# Patient Record
Sex: MALE | Race: OTHER | HISPANIC OR LATINO | URBAN - METROPOLITAN AREA
[De-identification: names, ages, dates, MRNs, and addresses within clinical notes are randomized per-mention and may not be internally consistent; named-entity substitution may affect disease eponyms.]

---

## 2023-04-03 ENCOUNTER — EMERGENCY (EMERGENCY)
Facility: HOSPITAL | Age: 38
LOS: 0 days | Discharge: HOME | End: 2023-04-03
Attending: EMERGENCY MEDICINE
Payer: COMMERCIAL

## 2023-04-03 VITALS
RESPIRATION RATE: 18 BRPM | DIASTOLIC BLOOD PRESSURE: 88 MMHG | OXYGEN SATURATION: 100 % | TEMPERATURE: 99 F | SYSTOLIC BLOOD PRESSURE: 143 MMHG | HEART RATE: 88 BPM

## 2023-04-03 VITALS — HEIGHT: 68 IN | WEIGHT: 179.9 LBS
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
## 2023-04-03 DIAGNOSIS — R51.9 HEADACHE, UNSPECIFIED: ICD-10-CM

## 2023-04-03 DIAGNOSIS — M54.2 CERVICALGIA: ICD-10-CM
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
## 2023-04-03 DIAGNOSIS — S00.93XA CONTUSION OF UNSPECIFIED PART OF HEAD, INITIAL ENCOUNTER: ICD-10-CM

## 2023-04-03 DIAGNOSIS — I10 ESSENTIAL (PRIMARY) HYPERTENSION: ICD-10-CM

## 2023-04-03 DIAGNOSIS — E78.5 HYPERLIPIDEMIA, UNSPECIFIED: ICD-10-CM

## 2023-04-03 PROCEDURE — 99284 EMERGENCY DEPT VISIT MOD MDM: CPT | Mod: 25

## 2023-04-03 PROCEDURE — 70450 CT HEAD/BRAIN W/O DYE: CPT | Mod: 26,MA

## 2023-04-03 PROCEDURE — 70450 CT HEAD/BRAIN W/O DYE: CPT | Mod: MA

## 2023-04-03 PROCEDURE — 99284 EMERGENCY DEPT VISIT MOD MDM: CPT

## 2023-04-03 RX ORDER — IBUPROFEN 200 MG
400 TABLET ORAL ONCE
Refills: 0 | Status: COMPLETED | OUTPATIENT
Start: 2023-04-03 | End: 2023-04-03

## 2023-04-03 RX ADMIN — Medication 400 MILLIGRAM(S): at 10:06

## 2023-04-03 NOTE — ED PROVIDER NOTE - PSYCHIATRIC, MLM
Alert and oriented to person, place, time/situation. normal mood and affect. no apparent risk to self or others.
2023 04:36

## 2023-04-03 NOTE — ED PROVIDER NOTE - OBJECTIVE STATEMENT
used, id# [id number]  38 year old male with hx of HTN and HLD presents to ED for c/o headache and neck pain x3 days after a piece of wood fell on his head at work. States after the wood fell on him, he spit up blood. Patient endorses a lot of pain and swelling. Denies LOC. Denies vomiting. Patient hasn't taken Tylenol or Motrin since the accident.

## 2023-04-03 NOTE — ED ADULT NURSE NOTE - OBJECTIVE STATEMENT
Patient with complaints of headache, neck pain s/p piece of wood (2x4) hit him in the head 3 days ago; no LOC; patient states that he spit up blood after incident; no nausea/vomiting since; no vision changes, no weakness; ambulating with steady gait- PAM Cerrato RN

## 2023-04-03 NOTE — ED PROVIDER NOTE - PATIENT PORTAL LINK FT
You can access the FollowMyHealth Patient Portal offered by [facility] by registering at the following website: [email or web address]. By joining [facility]’s FollowMyHealth portal, you will also be able to view your health information using other applications (apps) compatible with our system.

## 2023-04-03 NOTE — ED PROVIDER NOTE - NSFOLLOWUPINSTRUCTIONS_ED_ALL_ED_FT
Follow-up with your regular physician  Return with any persistent/worsening symptoms.   Ibuprofen as needed for pain    Lesión en la lata en los adultos  Head Injury, Adult       Hay muchos tipos de lesiones en la lata. Las lesiones en la lata pueden ser tan leves gilda un chichón pequeño o pueden ser un problema médico grave. Algunas de las lesiones graves en la lata son:    Lesión que provoque un impacto en el cerebro (conmoción cerebral).  Un moretón (contusión) en el cerebro. Rodanthe significa que hay hemorragia en el cerebro que puede causar hinchazón.  Fisura en el cráneo (fractura de cráneo).  Hemorragia en el cerebro que se acumula, se coagula y forma shubham protuberancia (hematoma).    Después de shubham lesión en la lata, la mayoría de los problemas ocurren dentro de las primeras 24 horas, moy los efectos secundarios pueden aparecer entre 7 y 10 días después de la lesión. Es importante controlar canales afección para andria si hay cambios. Es posible que deban observarlo en el departamento de emergencias o en el servicio de atención urgente, o también puede ser que deban hospitalizarlo.    ¿Cuáles son las causas?  Hay muchas causas posibles de shubham lesión en la lata. Las lesiones graves en la lata puede deberse a un accidente automovilístico, a accidentes en bicicleta o motocicleta, a lesiones deportivas, a caídas y a ser golpeado por un objeto.    ¿Cuáles son los síntomas?  Los síntomas de lesión en la lata incluyen shubham contusión, un chichón o sangrado en el lugar de la lesión. Otros síntomas físicos pueden ser:    Dolor de lata.  Náuseas o vómitos.  Mareos.  Visión borrosa o doble.  Sentir incomodidad cerca de luces brillantes o ruidos eden.  Convulsiones.  Cansancio.  Dificultad para despertarse.  Pérdida de la conciencia.    Los síntomas mentales o emocionales pueden incluir los siguientes:    Irritabilidad.  Confusión y problemas de memoria.  Falta de atención y concentración.  Cambios en los hábitos de alimentación o en el sueño.  Sentir ansiedad o depresión.    ¿Cómo se diagnostica?  Esta afección suele diagnosticarse en función de los síntomas, de shubham descripción de la lesión y de un examen físico. También pueden hacerle estudios de diagnóstico por imágenes, gilda shubham resonancia magnética (RM) o shubham exploración por tomografía computarizada (TC).    ¿Cómo se trata?  El tratamiento de esta afección depende de la gravedad y el tipo de lesión que sufrió. El objetivo principal del tratamiento es prevenir complicaciones y darle tiempo al cerebro para que se recupere.        Lesión de lata leve    Si usted sufre shubham lesión de lata leve, es posible que lo envíen a casa, y el tratamiento puede incluir lo siguiente:    Observación. Un adulto responsable debe quedarse con usted ronal 24 horas después de producida la lesión y controlarlo con frecuencia.  Lincoln físico.  Lincoln cerebral.  Analgésicos.        Lesión de lata grave    Si tiene shubham lesión de lata grave, el tratamiento puede incluir lo siguiente:    Observación minuciosa. Rodanthe incluye hospitalización con la siguiente atención:    Exámenes físicos frecuentes.  Controles frecuentes del funcionamiento del cerebro y el sistema nervioso (estado neurológico).  Control de la presión arterial y los niveles de oxígeno.  Medicamentos para aliviar el dolor, prevenir las convulsiones y disminuir la hinchazón del cerebro.  Protección de las vías respiratorias y asistencia respiratoria. Rodanthe puede incluir un respirador.  Tratamientos para controlar y tratar la hinchazón dentro del cerebro.  Cirugía de cerebro. Esta puede ser necesaria en los siguientes casos:    Extraer shubham acumulación de itz o coágulos de itz.  Interrumpir el sangrado.  Retirar shubham parte del cráneo para dejar espacio a fin de que el cerebro se hinche.    Siga estas instrucciones en canales casa:      Actividad    Descanse y evite actividades que sherif físicamente difíciles o agotadoras.  Asegúrese de dormir lo suficiente.  Deje que el cerebro descanse limitando las actividades que requieran pensar mucho o prestar atención, gilda las siguientes:    Mirar televisión.  Jugar juegos de memoria y armar rompecabezas.  Tareas para el hogar o trabajos relacionados con el empleo.  Trabajar en la computadora, usar las redes sociales y enviar mensajes de texto.  Evite actividades que puedan causar otra lesión en la lata, gilda practicar deportes, hasta que el médico lo autorice. Puede ser peligroso tener otra lesión en la lata antes de que se haya recuperado de la primera.  Pregúntele al médico cuándo puede retomar saira actividades habituales, incluidos el trabajo o el estudio. Pídale al médico un plan escalonado para retomar saira actividades de forma gradual.  Consulte a canales médico sobre cuándo puede conducir, andar en bicicleta o usar maquinaria pesada. La capacidad para reaccionar puede ser más lenta luego de shubham lesión cerebral. No realice estas actividades si se siente mareado.        Estilo de jose     No suman alcohol hasta que el médico lo autorice. No consuma drogas. El alcohol y ciertas drogas pueden demorar canales recuperación y ponerlo en riesgo de nuevas lesiones.  Si le resulta más difícil que lo habitual recordar las cosas, escríbalas.  Trate de hacer shubham cosa por vez si se distrae con facilidad.  Consulte con familiares y amigos si debe panda decisiones importantes.  Cuénteles sobre canales lesión, los síntomas y las restricciones a saira amigos, a canales jenny, a un colega de confianza y a canales gerente en el trabajo. Pídales que observen si aparecen nuevos problemas o empeoran los existentes.        Instrucciones generales    Whiteface los medicamentos de venta ralph y los recetados solamente gilda se lo haya indicado el médico.  Pídale a alguien que lo acompañe ronal 24 horas después de la lesión en la lata. Esta persona debe observar cambios en los síntomas y estar preparada para obtener ayuda médica.  Concurra a todas las visitas de seguimiento gilda se lo haya indicado el médico. Rodanthe es importante.    ¿Cómo se chrissy?  Trabaje para mejorar el equilibrio y la fuerza a fin de evitar caídas.  Use el cinturón de seguridad cuando se encuentre en un vehículo en movimiento.  Use un mag al andar en bicicleta, esquiar o practicar cualquier otro deporte o actividad que tenga riesgo de lesiones.  Si wm alcohol:    Limite la cantidad que wm:    De 0 a 1 medida por día para las mujeres que no estén embarazadas.  De 0 a 2 medidas por día para los hombres.  Esté atento a la cantidad de alcohol que hay en las bebidas que les. En los [place], shubham medida equivale a shubham botella de cerveza de 12 oz (355 ml), un vaso de vino de 5 oz (148 ml) o un vaso de shubham bebida alcohólica de murray graduación de 1½ oz (44 ml).  Whiteface medidas de seguridad en canales hogar, por ejemplo:    Mantenga los pisos y las escaleras en orden.  Coloque barras para sostén en los marshal y pasamanos en las escaleras.  Ponga alfombras antideslizantes en pisos y bañeras.  Mejore la iluminación en las zonas de penumbra.    Dónde buscar más información  Centers for Disease Control and Prevention (Centros para el Control y la Prevención de Enfermedades): www.cdc.gov    Solicite ayuda de inmediato si:  Tiene lo siguiente:    Dolor de lata intenso que no desaparece con los medicamentos.  Dificultad para caminar o debilidad en los brazos o las piernas.  Secreción transparente o con itz que proviene de la nariz o de los oídos.  Cambios en la visión.  Shubham convulsión.  Mayor confusión o irritabilidad.  Saira síntomas empeoran.  Está más somnoliento de lo normal o tiene dificultad para mantenerse despierto.  Pierde el equilibrio.  Las pupilas cambian de tamaño.  Arrastra las palabras.  Canales mareo empeora.  Vomita.    Estos síntomas pueden representar un problema grave que constituye shubham emergencia. No espere a andria si los síntomas desaparecen. Solicite atención médica de inmediato. Comuníquese con el servicio de emergencias de canales localidad (911 en los [place]). No conduzca por saira propios medios hasta el hospital.    Resumen  Las lesiones en la lata pueden ser leves o pueden ser un problema médico grave que requiere atención inmediata.  El tratamiento de esta afección depende de la gravedad y el tipo de lesión que sufrió.  Pídale a alguien que lo acompañe ronal 24 horas después de la lesión y que new cómo está usted con frecuencia.  Pregúntele al médico cuándo puede retomar saira actividades habituales, incluidos el trabajo o el estudio.  La prevención de lesiones en la lata incluye el uso de cinturón de seguridad en un vehículo motorizado, el uso de mag en bicicleta, limitar el consumo de alcohol y panda medidas de seguridad en el hogar.    NOTAS ADICIONALES E INSTRUCCIONES    Please follow up with your Primary MD in 24-48 hr.  Seek immediate medical care for any new/worsening signs or symptoms.

## 2023-04-03 NOTE — ED ADULT TRIAGE NOTE - CHIEF COMPLAINT QUOTE
pt presents to ed for evaluation of headache and nausea x 3 days after a piece of 2x4 wood fell on his head at work. denies medical hx

## 2023-06-20 NOTE — ED PROVIDER NOTE - WET READ LAUNCH FT
Chief Complaint:   Katia Rivera is a 60 y.o. male who presents forcomplete physical exam.    History of Present Illness:      Katia Rivera is a 60 y.o. male who presents todayfor wellness visit AND follow up on his chronic medical conditions as noted below. 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    Adopted 07/26/2019    History of colon polyps 07/26/2019    History of anal fissures 07/26/2019    History of hemorrhoids 07/26/2019    BRBPR (bright red blood per rectum) 07/26/2019    Rectal pain 07/26/2019    Rectal burning 07/26/2019    Rectal itching 07/26/2019    Fecal smearing 07/26/2019    Postsurgical dumping syndrome 07/26/2019    S/P cholecystectomy 07/26/2019    Chronic heartburn 07/26/2019    Pure hypercholesterolemia 02/22/2019    Exogenous obesity 08/10/2018    B12 deficiency 06/01/2018    Vitamin D deficiency 06/01/2018    IFG (impaired fasting glucose) 05/31/2018    Essential hypertension 05/31/2018    Idiopathic peripheral neuropathy 05/31/2018    Gastroesophageal reflux disease without esophagitis 05/31/2018    Postprandial diarrhea 05/31/2018       Past Medical History:   Diagnosis Date    Montelongo esophagus     GERD (gastroesophageal reflux disease)     H/O seasonal allergies     History of IBS     Hyperlipidemia     Hypertension     Neuropathy     Vitamin D deficiency        Past Surgical History:   Procedure Laterality Date    CHOLECYSTECTOMY      COLONOSCOPY  08/29/2013    Dr Ramos Imus yr recall    COLONOSCOPY N/A 8/2/2019    Dr Bella Seek internal hemorrhoids noted-5 yr recall    EGD  08/29/2013    Dr Parsons-Montelongo's, no atypia--2 yr recall    UPPER GASTROINTESTINAL ENDOSCOPY N/A 8/2/2019    Dr Rad Monzon montelongo's, no recall       Current Outpatient Medications   Medication Sig Dispense Refill    gabapentin (NEURONTIN) 300 MG capsule TAKE ONE CAPSULE BY MOUTH FOUR TIMES DAILY.  120 capsule 2
There are no Wet Read(s) to document.

## 2023-07-09 ENCOUNTER — EMERGENCY (EMERGENCY)
Facility: HOSPITAL | Age: 38
LOS: 0 days | Discharge: HOME | End: 2023-07-09
Attending: EMERGENCY MEDICINE
Payer: SELF-PAY

## 2023-07-09 VITALS
TEMPERATURE: 98 F | HEART RATE: 65 BPM | OXYGEN SATURATION: 96 % | RESPIRATION RATE: 18 BRPM | SYSTOLIC BLOOD PRESSURE: 131 MMHG | DIASTOLIC BLOOD PRESSURE: 91 MMHG

## 2023-07-09 VITALS — HEIGHT: 69 IN | WEIGHT: 179.9 LBS

## 2023-07-09 DIAGNOSIS — L29.9 PRURITUS, UNSPECIFIED: ICD-10-CM

## 2023-07-09 DIAGNOSIS — I10 ESSENTIAL (PRIMARY) HYPERTENSION: ICD-10-CM

## 2023-07-09 DIAGNOSIS — R21 RASH AND OTHER NONSPECIFIC SKIN ERUPTION: ICD-10-CM

## 2023-07-09 DIAGNOSIS — E78.5 HYPERLIPIDEMIA, UNSPECIFIED: ICD-10-CM

## 2023-07-09 DIAGNOSIS — L23.7 ALLERGIC CONTACT DERMATITIS DUE TO PLANTS, EXCEPT FOOD: ICD-10-CM

## 2023-07-09 PROCEDURE — 99284 EMERGENCY DEPT VISIT MOD MDM: CPT

## 2023-07-09 PROCEDURE — 99283 EMERGENCY DEPT VISIT LOW MDM: CPT

## 2023-07-09 NOTE — ED ADULT TRIAGE NOTE - CHIEF COMPLAINT QUOTE
Pt presents to the ED c/o poison ivy to b/l arms. Pt reports itching. Pt has not taken any medication.

## 2023-07-09 NOTE — ED STATDOCS - NS ED ATTENDING STATEMENT MOD
This was a shared visit with the YESENIA. I reviewed and verified the documentation and independently performed the documented: Poor

## 2023-07-09 NOTE — ED STATDOCS - PHYSICAL EXAMINATION
Gen: Well appearing in NAD  Head: NC/AT  Neck: Trachea midline  Resp: No distress  Ext: Forearms with erythematous vesicular lesions bilaterally  Neuro: A&O appears non focal  Skin: Warm and dry as visualized  Psych: Normal affect and mood

## 2023-07-09 NOTE — ED ADULT NURSE NOTE - CHIEF COMPLAINT
The patient is a 38y Male complaining of rash.
ADL retraining/fine motor coordination training/ROM/strengthening

## 2023-07-09 NOTE — ED STATDOCS - PROGRESS NOTE DETAILS
patient seen and evaluated.  +poison ivy.  will treat with prednisone taper and dc -Aliza Layton PA-C

## 2023-07-09 NOTE — ED STATDOCS - PATIENT PORTAL LINK FT
You can access the FollowMyHealth Patient Portal offered by [facility] by registering at the following website: [email or web address]. By joining [facility]’s FollowMyHealth portal, you will also be able to view your health information using other applications (apps) compatible with our system.

## 2023-07-09 NOTE — ED STATDOCS - OBJECTIVE STATEMENT
39 y/o male with PMHx of HTN and HLD presents to the ED c/o rash. 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 C/o associated redness and itching. Has not taken any meds.

## 2023-07-09 NOTE — ED ADULT NURSE NOTE - NSFALLUNIVINTERV_ED_ALL_ED
Bed/Stretcher in lowest position, wheels locked, appropriate side rails in place/Call bell, personal items and telephone in reach/Instruct patient to call for assistance before getting out of bed/chair/stretcher/Non-slip footwear applied when patient is off stretcher/[place] to call system/Physically safe environment - no spills, clutter or unnecessary equipment/Purposeful proactive rounding/Room/bathroom lighting operational, light cord in reach

## 2023-07-09 NOTE — ED STATDOCS - NSFOLLOWUPINSTRUCTIONS_ED_ALL_ED_FT
Poison Ivy Dermatitis  Poison ivy dermatitis is inflammation of the skin that is caused by chemicals in the leaves of the poison ivy plant. The skin reaction often involves redness, swelling, blisters, and extreme itching.    What are the causes?  This condition is caused by a chemical (urushiol) found in the sap of the poison ivy plant. This chemical is sticky and can be easily spread to people, animals, and objects. You can get poison ivy dermatitis by:  Having direct contact with a poison ivy plant.  Touching animals, other people, or objects that have come in contact with poison ivy and have the chemical on them.  What increases the risk?  This condition is more likely to develop in people who:  Are outdoors often in wooded or marshy areas.  Go outdoors without wearing protective clothing, such as closed shoes, long pants, and a long-sleeved shirt.  What are the signs or symptoms?  A hand with red patches and a close-up of white blisters.  Symptoms of this condition include:  Redness of the skin.  Extreme itching.  A rash that often includes bumps and blisters. The rash usually appears 48 hours after exposure, if you have been exposed before. If this is the first time you have been exposed, the rash may not appear until a week after exposure.  Swelling. This may occur if the reaction is more severe.  Symptoms usually last for 1–2 weeks. However, the first time you develop this condition, symptoms may last 3–4 weeks.    How is this diagnosed?  This condition may be diagnosed based on your symptoms and a physical exam. Your health care provider may also ask you about any recent outdoor activity.    How is this treated?  Treatment for this condition will vary depending on how severe it is. Treatment may include:  Hydrocortisone cream or calamine lotion to relieve itching.  Oatmeal baths to soothe the skin.  Medicines, such as over-the-counter antihistamine tablets.  Oral steroid medicine, for more severe reactions.  Follow these instructions at home:  Medicines    Take or apply over-the-counter and prescription medicines only as told by your health care provider.  Use hydrocortisone cream or calamine lotion as needed to soothe the skin and relieve itching.  General instructions    Do not scratch or rub your skin.  Apply a cold, wet cloth (cold compress) to the affected areas or take baths in cool water. This will help with itching. Avoid hot baths and showers.  Take oatmeal baths as needed. Use colloidal oatmeal. You can get this at your local pharmacy or grocery store. Follow the instructions on the packaging.  While you have the rash, wash clothes right after you wear them.  Keep all follow-up visits as told by your health care provider. This is important.  How is this prevented?  Poison ivy leaves.  Learn to identify the poison ivy plant and avoid contact with the plant. This plant can be recognized by the number of leaves. Generally, poison ivy has three leaves with flowering branches on a single stem. The leaves are typically glossy, and they have jagged edges that come to a point at the front.  If you have been exposed to poison ivy, thoroughly wash with soap and water right away. You have about 30 minutes to remove the plant resin before it will cause the rash. Be sure to wash under your fingernails, because any plant resin there will continue to spread the rash.  When hiking or camping, wear clothes that will help you to avoid exposure on the skin. This includes long pants, a long-sleeved shirt, tall socks, and hiking boots. You can also apply preventive lotion to your skin to help limit exposure.  If you suspect that your clothes or outdoor gear came in contact with poison ivy, rinse them off outside with a garden hose before you bring them inside your house.  When doing yard work or gardening, wear gloves, long sleeves, long pants, and boots. Wash your garden tools and gloves if they come in contact with poison ivy.  If you suspect that your pet has come into contact with poison ivy, wash him or her with pet shampoo and water. Make sure to wear gloves while washing your pet.  Contact a health care provider if you have:  Open sores in the rash area.  More redness, swelling, or pain in the affected area.  Redness that spreads beyond the rash area.  Fluid, blood, or pus coming from the affected area.  A fever.  A rash over a large area of your body.  A rash on your eyes, mouth, or genitals.  A rash that does not improve after a few weeks.  Get help right away if:  Your face swells or your eyes swell shut.  You have trouble breathing.  You have trouble swallowing.  These symptoms may represent a serious problem that is an emergency. Do not wait to see if the symptoms will go away. Get medical help right away. Call your local emergency services (911 in the [place] Do not drive yourself to the hospital.    Summary  Poison ivy dermatitis is inflammation of the skin that is caused by chemicals in the leaves of the poison ivy plant.  Symptoms of this condition include redness, itching, a rash, and swelling.  Do not scratch or rub your skin.  Take or apply over-the-counter and prescription medicines only as told by your health care provider.  This information is not intended to replace advice given to you by your health care provider. Make sure you discuss any questions you have with your health care provider.

## 2023-07-10 PROBLEM — E78.5 HYPERLIPIDEMIA, UNSPECIFIED: Chronic | Status: ACTIVE | Noted: 2023-04-03

## 2023-07-10 PROBLEM — I10 ESSENTIAL (PRIMARY) HYPERTENSION: Chronic | Status: ACTIVE | Noted: 2023-04-03
